# Patient Record
Sex: MALE | Race: WHITE | NOT HISPANIC OR LATINO | Employment: UNEMPLOYED | URBAN - METROPOLITAN AREA
[De-identification: names, ages, dates, MRNs, and addresses within clinical notes are randomized per-mention and may not be internally consistent; named-entity substitution may affect disease eponyms.]

---

## 2017-03-04 ENCOUNTER — HOSPITAL ENCOUNTER (EMERGENCY)
Facility: HOSPITAL | Age: 42
Discharge: HOME | End: 2017-03-04
Attending: EMERGENCY MEDICINE | Admitting: EMERGENCY MEDICINE
Payer: COMMERCIAL

## 2017-03-04 VITALS
WEIGHT: 176.13 LBS | BODY MASS INDEX: 23.24 KG/M2 | DIASTOLIC BLOOD PRESSURE: 103 MMHG | TEMPERATURE: 98.7 F | HEART RATE: 112 BPM | SYSTOLIC BLOOD PRESSURE: 142 MMHG | OXYGEN SATURATION: 96 % | RESPIRATION RATE: 16 BRPM

## 2017-03-04 DIAGNOSIS — L25.9 CONTACT DERMATITIS: Primary | ICD-10-CM

## 2017-03-04 PROCEDURE — 99282 EMERGENCY DEPT VISIT SF MDM: CPT

## 2017-03-04 RX ORDER — PREDNISONE 20 MG/1
TABLET ORAL
Qty: 22 TABLET | Refills: 0 | Status: SHIPPED | OUTPATIENT
Start: 2017-03-04 | End: 2017-07-15

## 2017-03-04 RX ORDER — CALAMINE
LOTION (ML) TOPICAL AS NEEDED
COMMUNITY
End: 2017-07-15

## 2017-07-15 ENCOUNTER — APPOINTMENT (EMERGENCY)
Dept: RADIOLOGY | Facility: HOSPITAL | Age: 42
End: 2017-07-15
Payer: COMMERCIAL

## 2017-07-15 ENCOUNTER — HOSPITAL ENCOUNTER (EMERGENCY)
Facility: HOSPITAL | Age: 42
Discharge: HOME | End: 2017-07-15
Attending: EMERGENCY MEDICINE | Admitting: EMERGENCY MEDICINE
Payer: COMMERCIAL

## 2017-07-15 VITALS
SYSTOLIC BLOOD PRESSURE: 147 MMHG | BODY MASS INDEX: 23.49 KG/M2 | RESPIRATION RATE: 18 BRPM | OXYGEN SATURATION: 98 % | HEART RATE: 112 BPM | WEIGHT: 177.2 LBS | HEIGHT: 73 IN | DIASTOLIC BLOOD PRESSURE: 85 MMHG | TEMPERATURE: 98.3 F
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
## 2017-07-15 DIAGNOSIS — S50.12XA CONTUSION OF LEFT FOREARM, INITIAL ENCOUNTER: ICD-10-CM

## 2017-07-15 DIAGNOSIS — S40.022A CONTUSION OF LEFT UPPER ARM, INITIAL ENCOUNTER: ICD-10-CM

## 2017-07-15 DIAGNOSIS — S40.012A CONTUSION OF LEFT SHOULDER, INITIAL ENCOUNTER: ICD-10-CM

## 2017-07-15 PROCEDURE — 73090 X-RAY EXAM OF FOREARM: CPT

## 2017-07-15 PROCEDURE — 73030 X-RAY EXAM OF SHOULDER: CPT

## 2017-07-15 PROCEDURE — 73060 X-RAY EXAM OF HUMERUS: CPT

## 2017-07-15 PROCEDURE — 99283 EMERGENCY DEPT VISIT LOW MDM: CPT

## 2017-07-15 RX ORDER — FENTANYL 50 UG/H
1 PATCH TRANSDERMAL
COMMUNITY

## 2017-07-15 RX ORDER — NAPROXEN 250 MG/1
500 TABLET ORAL ONCE
Status: COMPLETED | OUTPATIENT
Start: 2017-07-15 | End: 2017-07-15

## 2017-07-15 RX ADMIN — NAPROXEN 500 MG: 250 TABLET ORAL at 12:09

## 2020-07-11 ENCOUNTER — HOSPITAL ENCOUNTER (EMERGENCY)
Facility: HOSPITAL | Age: 45
Discharge: HOME | End: 2020-07-11
Attending: EMERGENCY MEDICINE | Admitting: EMERGENCY MEDICINE
Payer: COMMERCIAL

## 2020-07-11 VITALS
SYSTOLIC BLOOD PRESSURE: 181 MMHG | TEMPERATURE: 97.9 F | OXYGEN SATURATION: 99 % | RESPIRATION RATE: 16 BRPM | DIASTOLIC BLOOD PRESSURE: 106 MMHG | HEART RATE: 100 BPM

## 2020-07-11 DIAGNOSIS — L23.7 POISON IVY DERMATITIS: Primary | ICD-10-CM

## 2020-07-11 PROCEDURE — 99284 EMERGENCY DEPT VISIT MOD MDM: CPT | Performed by: EMERGENCY MEDICINE

## 2020-07-11 PROCEDURE — 99282 EMERGENCY DEPT VISIT SF MDM: CPT

## 2020-07-11 RX ORDER — METHYLPREDNISOLONE 4 MG/1
TABLET ORAL
Qty: 21 TABLET | Refills: 0 | Status: SHIPPED | OUTPATIENT
Start: 2020-07-11 | End: 2020-07-11 | Stop reason: SDUPTHER

## 2020-07-11 RX ORDER — METHYLPREDNISOLONE 4 MG/1
TABLET ORAL
Qty: 21 TABLET | Refills: 0 | Status: SHIPPED | OUTPATIENT
Start: 2020-07-11

## 2020-07-11 RX ADMIN — PREDNISONE 50 MG: 20 TABLET ORAL at 06:06

## 2020-07-11 NOTE — ED PROVIDER NOTES
History  Chief Complaint   Patient presents with   [facility]     Pt presents to the ED with poison ivy he feels that he got from clearing weeds on Tuesday     This is a 23-year-old male presents to the emergency department complaining of a rash  Patient states he was clearing was on Tuesday and began feeling itchy afterwards  He feels like he was exposed with an IV  He has had similar rashes in the past   He states that he has used prednisone in the past and it alleviates the symptoms  Patient states the rash is on his arms, his legs, in his neck  Patient denies any fevers or chills  The patient denies any involvement mucous membranes  The patient denies any chest pain or trouble breathing  Prior to Admission Medications   Prescriptions Last Dose Informant Patient Reported? Taking? albuterol (PROVENTIL HFA,VENTOLIN HFA) 90 mcg/act inhaler   Yes No   Sig: Inhale 2 puffs every 6 (six) hours as needed for wheezing  clonazePAM (KlonoPIN) 1 mg tablet   Yes No   Sig: Take 1 mg by mouth 3 (three) times a day as needed for seizures  cyclobenzaprine (FLEXERIL) 10 mg tablet   Yes No   Sig: Take 10 mg by mouth 3 (three) times a day as needed for muscle spasms  fentaNYL (DURAGESIC) 50 mcg/hr   Yes No   Sig: Place 1 patch on the skin every third day   gabapentin (NEURONTIN) 300 mg capsule   Yes No   Sig: Take 300 mg by mouth daily at bedtime  lisinopril (ZESTRIL) 20 mg tablet   Yes Yes   Sig: Take 20 mg by mouth daily  omeprazole (PriLOSEC) 20 mg delayed release capsule   Yes Yes   Sig: Take 20 mg by mouth daily     oxyCODONE (ROXICODONE) 30 MG immediate release tablet   Yes No   Sig: Take 15 mg by mouth every 6 (six) hours as needed for moderate pain        Facility-Administered Medications: None       Past Medical History:   Diagnosis Date    Asthma     Brain damage     s/p 50 ft fall tbi    Chronic pain     GERD (gastroesophageal reflux disease)     Hypertension     Psychiatric disorder anxiety    Rheumatoid arthritis ([facility] [address] )        Past Surgical History:   Procedure Laterality Date    BACK SURGERY      BRAIN SURGERY         History reviewed  No pertinent family history  I have reviewed and agree with the history as documented  E-Cigarette/Vaping    E-Cigarette Use Never User      E-Cigarette/Vaping Substances     Social History     Tobacco Use    Smoking status: Current Every Day Smoker     Packs/day: 0 50    Smokeless tobacco: Never Used   Substance Use Topics    Alcohol use: No    Drug use: Yes     Types: Marijuana       Review of Systems   All other systems reviewed and are negative        Physical Exam  Physical Exam  Constitutional: Vital signs reviewed, patient appears anxious   Eyes: Extraocular movements intact   HEENT: Trachea midline, no JVD, moist mucous membranes   Respiratory: Equal chest expansion   Cardiovascular: Well perfused   Abdomen: No distension   Extremities: No edema   Neuro: awake, alert, oriented, no focal weakness   Skin: warm, dry, intact, diffuse vesicular rash covering the arms, legs, neck, no mucous membrane involvement, mild swelling to the left upper extremity, psoriatic rash to both anterior shins       Vital Signs  ED Triage Vitals   Temperature Pulse Respirations Blood Pressure SpO2   07/11/20 0558 07/11/20 0558 07/11/20 0558 07/11/20 0601 07/11/20 0558   97 9 °F (36 6 °C) 100 16 (!) 181/106 99 %      Temp Source Heart Rate Source Patient Position - Orthostatic VS BP Location FiO2 (%)   07/11/20 0558 07/11/20 0558 07/11/20 0558 07/11/20 0601 --   Tympanic Monitor Sitting Right arm       Pain Score       --                  Vitals:    07/11/20 0558 07/11/20 0601   BP:  (!) 181/106   Pulse: 100    Patient Position - Orthostatic VS: Sitting Sitting         Visual Acuity      ED Medications  Medications   predniSONE tablet 50 mg (50 mg Oral Given 7/11/20 0606)       Diagnostic Studies  Results Reviewed     None                 No orders to display Procedures  Procedures         ED Course       US AUDIT      Most Recent Value   Initial Alcohol Screen: US AUDIT-C    1  How often do you have a drink containing alcohol?  0 Filed at: 07/11/2020 0603   2  How many drinks containing alcohol do you have on a typical day you are drinking? 0 Filed at: 07/11/2020 0603   3a  Male UNDER 65: How often do you have five or more drinks on one occasion? 0 Filed at: 07/11/2020 0603   3b  FEMALE Any Age, or MALE 65+: How often do you have 4 or more drinks on one occassion? 0 Filed at: 07/11/2020 0603   Audit-C Score  0 Filed at: 07/11/2020 3014                  ADELIA/DAST-10      Most Recent Value   How many times in the past year have you    Used an illegal drug or used a prescription medication for non-medical reasons? Never Filed at: 07/11/2020 0603                                MDM  Number of Diagnoses or Management Options  Poison ivy dermatitis:   Diagnosis management comments: This is a 54-year-old male presents the emergency department complaining of a rash  The patient had a rash that was consistent with poison ivy dermatitis/contact dermatitis  Patient issue with steroids and discharged to follow-up to his primary care physician        Disposition  Final diagnoses:   Poison ivy dermatitis     Time reflects when diagnosis was documented in both MDM as applicable and the Disposition within this note     Time User Action Codes Description Comment    7/11/2020  6:02 AM Rod Posada Add [L23 7] Poison ivy dermatitis       ED Disposition     ED Disposition Condition Date/Time Comment    Discharge Stable Sat Jul 11, 2020 [address] [place] [address] discharge to home/self care              Follow-up Information     Follow up With Specialties Details Why Contact Info Additional [id number]  Emergency Department Emergency Medicine  If symptoms worsen [address] [facility],[address] [id number] [phone]  ED, [address] [place] [address]  [address]          Patient's Medications   Discharge Prescriptions    METHYLPREDNISOLONE 4 MG TABLET THERAPY PACK    Use as directed on package       Start Date: 7/11/2020 End Date: --       Order Dose: --       Quantity: 21 tablet    Refills: 0     No discharge procedures on file      PDMP Review     None          ED Provider  Electronically Signed by           Luz Lozano DO  07/13/20 5945